# Patient Record
Sex: FEMALE | Race: WHITE | NOT HISPANIC OR LATINO | Employment: OTHER | URBAN - METROPOLITAN AREA
[De-identification: names, ages, dates, MRNs, and addresses within clinical notes are randomized per-mention and may not be internally consistent; named-entity substitution may affect disease eponyms.]

---

## 2017-12-14 ENCOUNTER — APPOINTMENT (OUTPATIENT)
Dept: PHYSICAL THERAPY | Facility: CLINIC | Age: 47
End: 2017-12-14
Payer: COMMERCIAL

## 2017-12-14 PROCEDURE — 97161 PT EVAL LOW COMPLEX 20 MIN: CPT

## 2017-12-14 PROCEDURE — G8981 BODY POS CURRENT STATUS: HCPCS

## 2017-12-14 PROCEDURE — G8982 BODY POS GOAL STATUS: HCPCS

## 2017-12-18 ENCOUNTER — APPOINTMENT (OUTPATIENT)
Dept: PHYSICAL THERAPY | Facility: CLINIC | Age: 47
End: 2017-12-18
Payer: COMMERCIAL

## 2017-12-18 PROCEDURE — 97140 MANUAL THERAPY 1/> REGIONS: CPT

## 2017-12-18 PROCEDURE — 97110 THERAPEUTIC EXERCISES: CPT

## 2017-12-20 ENCOUNTER — APPOINTMENT (OUTPATIENT)
Dept: PHYSICAL THERAPY | Facility: CLINIC | Age: 47
End: 2017-12-20
Payer: COMMERCIAL

## 2017-12-20 PROCEDURE — 97112 NEUROMUSCULAR REEDUCATION: CPT

## 2017-12-20 PROCEDURE — 97110 THERAPEUTIC EXERCISES: CPT

## 2022-03-25 ENCOUNTER — OFFICE VISIT (OUTPATIENT)
Dept: OBGYN CLINIC | Facility: CLINIC | Age: 52
End: 2022-03-25
Payer: COMMERCIAL

## 2022-03-25 ENCOUNTER — APPOINTMENT (OUTPATIENT)
Dept: RADIOLOGY | Facility: CLINIC | Age: 52
End: 2022-03-25
Payer: COMMERCIAL

## 2022-03-25 VITALS
HEIGHT: 62 IN | DIASTOLIC BLOOD PRESSURE: 86 MMHG | HEART RATE: 102 BPM | BODY MASS INDEX: 27.16 KG/M2 | SYSTOLIC BLOOD PRESSURE: 133 MMHG | WEIGHT: 147.6 LBS

## 2022-03-25 DIAGNOSIS — M25.562 PAIN IN BOTH KNEES, UNSPECIFIED CHRONICITY: ICD-10-CM

## 2022-03-25 DIAGNOSIS — M25.561 PAIN IN BOTH KNEES, UNSPECIFIED CHRONICITY: ICD-10-CM

## 2022-03-25 DIAGNOSIS — M17.0 BILATERAL PRIMARY OSTEOARTHRITIS OF KNEE: ICD-10-CM

## 2022-03-25 DIAGNOSIS — M22.2X2 PATELLOFEMORAL SYNDROME OF BOTH KNEES: Primary | ICD-10-CM

## 2022-03-25 DIAGNOSIS — M22.2X1 PATELLOFEMORAL SYNDROME OF BOTH KNEES: Primary | ICD-10-CM

## 2022-03-25 PROCEDURE — 73562 X-RAY EXAM OF KNEE 3: CPT

## 2022-03-25 PROCEDURE — 99204 OFFICE O/P NEW MOD 45 MIN: CPT | Performed by: ORTHOPAEDIC SURGERY

## 2022-03-25 RX ORDER — ACETAMINOPHEN 325 MG/1
650 TABLET ORAL EVERY 6 HOURS PRN
COMMUNITY

## 2022-03-25 RX ORDER — IBUPROFEN 200 MG
TABLET ORAL EVERY 6 HOURS PRN
COMMUNITY

## 2022-03-25 NOTE — PROGRESS NOTES
Assessment/Plan:  1  Patellofemoral syndrome of both knees  Ambulatory Referral to Physical Therapy   2  Bilateral primary osteoarthritis of knee  Ambulatory Referral to Physical Therapy   3  Pain in both knees, unspecified chronicity  XR knee 3 vw left non injury    XR knee 3 vw right non injury       Scribe Attestation    I,:  Alley Muñoz am acting as a scribe while in the presence of the attending physician :       I,:  Rossana Jalloh MD personally performed the services described in this documentation    as scribed in my presence :             Upon evaluation and review of the bilateral knee xrays taken, I believe that July Mackay is presenting today with signs and symptoms consistent with bilateral patellofemoral pain syndrome as well as mild bilateral patellofemoral osteoarthritis  I discussed with July Mackay that for treatment of this condition, I recommend that she she pursue treatment in the form of formal physical therapy to strengthen and improve the function of her bilateral knees  Since July Mackay has experienced success with physical therapy in the past for her back pain, I believe that she should experience improvement in her bilateral knee function with physical therapy  I do not appreciate any findings that would indicate July Mackay for operative intervention at this time  I discussed with July Mackay that I will follow up with her again in 6 weeks for repeat clinical evaluation of her bilateral knees upon completion of 6 weeks of physical therapy  Subjective:   Kumar Jeter is a 46 y o  female who presents to the office today for initial evaluation of her bilateral knee pain  July Mackay states that she has been experiencing bilateral knee pain for over a year now  She states that her right knee is worse than her left today  She states that she experiences pain with activities such as walking and has to limit the distance she walks as well as speed that she walks at     She states that she experiences pain with squatting activities as well  In the mornings she experiences tightness about her knees when she gets out of bed which causes her pain  She experiences increased tension and pain with going up and down stairs  Review of Systems   Constitutional: Negative for chills and fever  HENT: Negative for ear pain and sore throat  Eyes: Negative for pain and visual disturbance  Respiratory: Negative for cough and shortness of breath  Cardiovascular: Negative for chest pain and palpitations  Gastrointestinal: Negative for abdominal pain and vomiting  Genitourinary: Negative for dysuria and hematuria  Musculoskeletal: Positive for arthralgias  Negative for back pain  Skin: Negative for color change and rash  Neurological: Negative for seizures and syncope  All other systems reviewed and are negative  History reviewed  No pertinent past medical history  History reviewed  No pertinent surgical history  Family History   Problem Relation Age of Onset    No Known Problems Mother     No Known Problems Father        Social History     Occupational History    Not on file   Tobacco Use    Smoking status: Never Smoker    Smokeless tobacco: Never Used   Substance and Sexual Activity    Alcohol use: Yes     Comment: social only    Drug use: Never    Sexual activity: Not on file         Current Outpatient Medications:     acetaminophen (TYLENOL) 325 mg tablet, Take 650 mg by mouth every 6 (six) hours as needed for mild pain, Disp: , Rfl:     ibuprofen (MOTRIN) 200 mg tablet, Take by mouth every 6 (six) hours as needed for mild pain, Disp: , Rfl:     No Known Allergies    Objective:  Vitals:    03/25/22 1011   BP: 133/86   Pulse: 102       Right Knee Exam     Tenderness   The patient is experiencing no tenderness       Range of Motion   Extension: 0   Flexion: 130     Tests   Varus: negative Valgus: negative  Drawer:  Anterior - negative    Posterior - negative    Other   Erythema: absent  Scars: absent  Sensation: normal      Left Knee Exam     Tenderness   The patient is experiencing no tenderness  Range of Motion   Extension: 0   Flexion: 130     Tests   Varus: negative Valgus: negative  Drawer:  Anterior - negative     Posterior - negative    Other   Erythema: absent  Scars: present  Sensation: normal            Physical Exam  HENT:      Head: Normocephalic and atraumatic  Eyes:      General:         Right eye: No discharge  Left eye: No discharge  Extraocular Movements: Extraocular movements intact  Conjunctiva/sclera: Conjunctivae normal    Cardiovascular:      Rate and Rhythm: Normal rate  Pulmonary:      Effort: Pulmonary effort is normal  No respiratory distress  Musculoskeletal:         General: Normal range of motion  Cervical back: Normal range of motion and neck supple  Skin:     General: Skin is warm and dry  Neurological:      Mental Status: She is alert and oriented to person, place, and time  Psychiatric:         Mood and Affect: Mood normal          Behavior: Behavior normal          I have personally reviewed pertinent films in PACS and my interpretation is as follows:    Review of the bilateral knee xrays taken today demonstrate mild patellofemoral osteoarthritis  There are no obvious acute osseous abnormalities or deformities

## 2022-03-28 ENCOUNTER — EVALUATION (OUTPATIENT)
Dept: PHYSICAL THERAPY | Facility: CLINIC | Age: 52
End: 2022-03-28
Payer: COMMERCIAL

## 2022-03-28 DIAGNOSIS — M17.0 BILATERAL PRIMARY OSTEOARTHRITIS OF KNEE: ICD-10-CM

## 2022-03-28 DIAGNOSIS — M22.2X1 PATELLOFEMORAL SYNDROME OF BOTH KNEES: ICD-10-CM

## 2022-03-28 DIAGNOSIS — M22.2X2 PATELLOFEMORAL SYNDROME OF BOTH KNEES: ICD-10-CM

## 2022-03-28 PROCEDURE — 97161 PT EVAL LOW COMPLEX 20 MIN: CPT | Performed by: PHYSICAL THERAPIST

## 2022-03-28 NOTE — PROGRESS NOTES
PT Evaluation     Today's date: 3/28/2022  Patient name: Mae Newby  : 1970  MRN: [id number]  Referring provider: Olivia Mcfadden*  Dx:   Encounter Diagnosis     ICD-10-CM    1  Patellofemoral syndrome of both knees  M22 2X1 Ambulatory Referral to Physical Therapy    M22 2X2    2  Bilateral primary osteoarthritis of knee  M17 0 Ambulatory Referral to Physical Therapy                  Assessment  Assessment details: Zohaib Camejo with signs and symptoms consistent with Patellofemoral syndrome of both knees  Bilateral primary osteoarthritis of knee, with loss of range of motion, strength and spinal stabilization  Presents with high reactivity  Mae Newby would benefit with physical therapy to address these impairments to return to prior level of function  Impairments: activity intolerance, impaired balance, impaired physical strength, lacks appropriate home exercise program and pain with function  Understanding of Dx/Px/POC: good   Prognosis: good    Goals  STG  Initiate HEP  SLS > 10 sec in 3 weeks  LTG  Independent with HEP  Strength to 4/5 in 6 weeks  SLS > 20sec in 6 weeks  FOTO > 45 in 6 weeks    Plan  Planned therapy interventions: neuromuscular re-education, stretching, strengthening, therapeutic exercise, balance, balance/weight bearing training and home exercise program  Frequency: 2x week  Duration in visits: 12  Duration in weeks: 6  Treatment plan discussed with: patient        Subjective Evaluation    History of Present Illness  Mechanism of injury: Patient reports bilateral knee pain for the past year that limits her walking ability  She denies any injury            Recurrent probem    Quality of life: good    Pain  Current pain ratin  At best pain rating: 3  At worst pain ratin  Location: Bilateral knee pain  Quality: dull ache and sharp  Relieving factors: rest and ice  Aggravating factors: stair climbing and walking    Treatments  Current treatment: physical therapy  Patient Goals  Patient goals for therapy: decreased pain, improved balance, increased motion, increased strength, independence with ADLs/IADLs and return to sport/leisure activities          Objective     Active Range of Motion   Left Knee   Flexion: 135 degrees   Extension: 0 degrees     Right Knee   Flexion: 135 degrees   Extension: 0 degrees     Strength/Myotome Testing     Left Knee   Flexion: 4-  Extension: 4-    Right Knee   Flexion: 3+  Extension: 3+    Functional Assessment        Single Leg Stance   Left: 4 seconds  Right: 3 seconds      Flowsheet Rows      Most Recent Value   PT/OT G-Codes    Current Score 1   Projected Score 45   FOTO information reviewed Yes   Assessment Type Evaluation   G code set Mobility: Walking & Moving Around             Precautions: standard      Manuals                                                                 Neuro Re-Ed                                                                                                        Ther Ex 3/28            SLR F,ABD 2x10            Clamshell 20x            Reverse clamshell 20x                                                                             Ther Activity                                       Gait Training                                       Modalities

## 2022-03-29 ENCOUNTER — OFFICE VISIT (OUTPATIENT)
Dept: PHYSICAL THERAPY | Facility: CLINIC | Age: 52
End: 2022-03-29
Payer: COMMERCIAL

## 2022-03-29 DIAGNOSIS — M22.2X1 PATELLOFEMORAL SYNDROME OF BOTH KNEES: Primary | ICD-10-CM

## 2022-03-29 DIAGNOSIS — M22.2X2 PATELLOFEMORAL SYNDROME OF BOTH KNEES: Primary | ICD-10-CM

## 2022-03-29 DIAGNOSIS — M17.0 BILATERAL PRIMARY OSTEOARTHRITIS OF KNEE: ICD-10-CM

## 2022-03-29 PROCEDURE — 97112 NEUROMUSCULAR REEDUCATION: CPT | Performed by: PHYSICAL THERAPIST

## 2022-03-29 PROCEDURE — 97110 THERAPEUTIC EXERCISES: CPT | Performed by: PHYSICAL THERAPIST

## 2022-03-29 NOTE — PROGRESS NOTES
Daily Note     Today's date: 3/29/2022  Patient name: Rory Gutierrez  : 1970  MRN: [id number]  Referring provider: Mari Cabrera*  Dx:   Encounter Diagnosis     ICD-10-CM    1  Patellofemoral syndrome of both knees  M22 2X1     M22 2X2    2  Bilateral primary osteoarthritis of knee  M17 0                   Subjective: I was sore from yesterday's exercise        Objective: See treatment diary below      Assessment: Tolerated treatment well  Patient demonstrated fatigue post treatment and exhibited good technique with therapeutic exercises      Plan: Continue per plan of care        Precautions: standard      Manuals                                                                 Neuro Re-Ed  3/29           [place] balance 4 way walkouts  #8 4x10           Balance lunge bosu  20x                                                                            Ther Ex 3/28            SLR F,ABD 2x10            Clamshell 20x            Reverse clamshell 20x            [place] Lunge  #8 20x           TRX Squats  20x           Nustep  10m L1           Step up             Lateral step up             Ther Activity                                       Gait Training                                       Modalities             CP  10m

## 2022-04-04 ENCOUNTER — OFFICE VISIT (OUTPATIENT)
Dept: PHYSICAL THERAPY | Facility: CLINIC | Age: 52
End: 2022-04-04
Payer: COMMERCIAL

## 2022-04-04 DIAGNOSIS — M22.2X2 PATELLOFEMORAL SYNDROME OF BOTH KNEES: Primary | ICD-10-CM

## 2022-04-04 DIAGNOSIS — M17.0 BILATERAL PRIMARY OSTEOARTHRITIS OF KNEE: ICD-10-CM

## 2022-04-04 DIAGNOSIS — M22.2X1 PATELLOFEMORAL SYNDROME OF BOTH KNEES: Primary | ICD-10-CM

## 2022-04-04 PROCEDURE — 97112 NEUROMUSCULAR REEDUCATION: CPT

## 2022-04-04 PROCEDURE — 97110 THERAPEUTIC EXERCISES: CPT

## 2022-04-04 NOTE — PROGRESS NOTES
Daily Note     Today's date: 2022  Patient name: Bj Irving  : 1970  MRN: [id number]  Referring provider: Rosibel Albarran*  Dx:   Encounter Diagnosis     ICD-10-CM    1  Patellofemoral syndrome of both knees  M22 2X1     M22 2X2    2  Bilateral primary osteoarthritis of knee  M17 0                   Subjective: Patient reports she has not done the home exercises because she lost the sheet with description  Patient also notes her right anterior hip has been bothersome recently described as a sharp pain that is intermittent  Objective: See treatment diary below      Assessment: Tolerated treatment fair  Patient arrived late to session, accommodated for  Focused today's session of lower body strength and patient education  Patient provided with a secondary print out of home exercises as well as a follow up e-mail  Patient with (+) right FADDIR  Patient demonstrated fatigue post treatment and exhibited good technique with therapeutic exercises      Plan: Continue per plan of care  Precautions: standard      Manuals                                                                 Neuro Re-Ed  3/29 04/04          Pedro balance 4 way walkouts  #8 4x10 #12 5 4x10          Balance lunge bosu  20x                                                                            Ther Ex 3/28            SLR F,ABD 2x10            Clamshell 20x            Reverse clamshell 20x            [place] Lunge  #8 20x TRX 2x10          TRX Squats  20x 20x          Nustep  10m L1           Step up             Lateral step up             LAQ   #5 2x10 R/L          HEP/ pt ed     updated          Ther Activity                                       Gait Training                                       Modalities             CP  10m

## 2022-04-06 ENCOUNTER — OFFICE VISIT (OUTPATIENT)
Dept: PHYSICAL THERAPY | Facility: CLINIC | Age: 52
End: 2022-04-06
Payer: COMMERCIAL

## 2022-04-06 DIAGNOSIS — M22.2X2 PATELLOFEMORAL SYNDROME OF BOTH KNEES: Primary | ICD-10-CM

## 2022-04-06 DIAGNOSIS — M17.0 BILATERAL PRIMARY OSTEOARTHRITIS OF KNEE: ICD-10-CM

## 2022-04-06 DIAGNOSIS — M22.2X1 PATELLOFEMORAL SYNDROME OF BOTH KNEES: Primary | ICD-10-CM

## 2022-04-06 PROCEDURE — 97110 THERAPEUTIC EXERCISES: CPT | Performed by: PHYSICAL THERAPIST

## 2022-04-06 PROCEDURE — 97112 NEUROMUSCULAR REEDUCATION: CPT | Performed by: PHYSICAL THERAPIST

## 2022-04-06 NOTE — PROGRESS NOTES
Daily Note     Today's date: 2022  Patient name: Riya No  : 1970  MRN: [id number]  Referring provider: Ashly Matson*  Dx:   Encounter Diagnosis     ICD-10-CM    1  Patellofemoral syndrome of both knees  M22 2X1     M22 2X2    2  Bilateral primary osteoarthritis of knee  M17 0                   Subjective: I have crepitus in both knees      Objective: See treatment diary below      Assessment: Tolerated treatment well  Patient demonstrated fatigue post treatment and exhibited good technique with therapeutic exercises      Plan: Continue per plan of care  Precautions: standard      Manuals                                                                 Neuro Re-Ed  3/29 04/04 4/6         Pedro balance 4 way walkouts  #8 4x10 #12 5 4x10 #13 4x10         Balance lunge bosu  20x  20x                                                                          Ther Ex 3/28   4/6         SLR F,ABD 2x10            Clamshell 20x            Reverse clamshell 20x            [place] Lunge  #8 20x TRX 2x10 #9 20x         TRX Squats  20x 20x 20x         Nustep  10m L1  10m L3         Step up    4" 20x         Lateral step up    4" 20x         LAQ   #5 2x10 R/L          HEP/ pt ed     updated          Ther Activity                                       Gait Training                                       Modalities             CP  10m

## 2022-05-06 ENCOUNTER — OFFICE VISIT (OUTPATIENT)
Dept: OBGYN CLINIC | Facility: CLINIC | Age: 52
End: 2022-05-06
Payer: COMMERCIAL

## 2022-05-06 VITALS
SYSTOLIC BLOOD PRESSURE: 133 MMHG | HEART RATE: 94 BPM | BODY MASS INDEX: 27.05 KG/M2 | WEIGHT: 147 LBS | DIASTOLIC BLOOD PRESSURE: 86 MMHG | HEIGHT: 62 IN

## 2022-05-06 DIAGNOSIS — M23.91 INTERNAL DERANGEMENT OF RIGHT KNEE: Primary | ICD-10-CM

## 2022-05-06 DIAGNOSIS — M22.2X1 PATELLOFEMORAL SYNDROME OF BOTH KNEES: ICD-10-CM

## 2022-05-06 DIAGNOSIS — M25.50 POLYARTHRALGIA: ICD-10-CM

## 2022-05-06 DIAGNOSIS — S29.012A STRAIN OF THORACIC SPINE: ICD-10-CM

## 2022-05-06 DIAGNOSIS — M22.2X2 PATELLOFEMORAL SYNDROME OF BOTH KNEES: ICD-10-CM

## 2022-05-06 PROCEDURE — 99214 OFFICE O/P EST MOD 30 MIN: CPT | Performed by: ORTHOPAEDIC SURGERY

## 2022-05-06 NOTE — PROGRESS NOTES
Assessment/Plan:  1  Internal derangement of right knee  MRI knee right  wo contrast   2  Patellofemoral syndrome of both knees     3  Polyarthralgia  Ambulatory Referral to Rheumatology   4  Strain of thoracic spine         Scribe Attestation    I,:  Cinda Cannon am acting as a scribe while in the presence of the attending physician :       I,:  Pennie Santos MD personally performed the services described in this documentation    as scribed in my presence :             Sarmad upon examination and review of the physical therapy notes as well as the x-rays of the right knee does demonstrate signs and symptoms that are concerning for a lateral meniscus tear  She does demonstrate positive Mohan's as well as point tenderness along the lateral joint line  Due to her failure of non operative care of the form physical therapy I would like to order an MRI of the right knee to question lateral meniscus tear  I did provide her with a referral for this today  She will follow up once the MRI of her right knee is completed and further delineation of care will be determined at that time  Sarmad does demonstrate pain in multiple areas upon exam today involving her back, hips and upper extremities  I do have concern that she is developing a rheumatologic disorder resulting in her polyarthralgia  With this in mind I would like to refer her to Rheumatology and Dr Ruddy Barnett for further evaluation  Sarmad verbalized understanding and was amenable to the treatment plan presented to her today  My office will help facilitate her appointment with Dr Ruddy Barnett  Subjective:   Geetha Rick is a 46 y o  female who presents to the office today for follow-up evaluation of her bilateral knee pain  She states that her right knee is worse than her left  She does localize pain to the anterolateral aspect of the right knee and anterior of the left    She states she does have significant pain after being seated for a prolonged period of time  She remarks that she will get a sharp burning the anterolateral aspect of her knee that is most severe during weight-bearing activities  She does not appreciate significant swelling of her knees  She has been participating in physical therapy and has not appreciate any significant symptomatic improvements  She does also have complaints bilateral hip pain and back extending into her thoracic spine  She denies any trauma has resulted her painful symptoms  She notes that she is seated for long period of time she will experience exacerbation of her hip pain  She denies any radiation of paresthesias into the lower extremities  She notes that the pain can radiate proximally into the thoracic spine  She does also have complaints elbow pain and intermittent shoulder pain with overhead reaching activities  Again, she denies any trauma to her shoulders or upper extremities resulting in her painful symptoms  Today she denies any distal paresthesias  She notes that she does not have a history of rheumatologic disorder  However, has not been thoroughly investigated by a rheumatologist       Review of Systems   Constitutional: Negative for chills, fever and unexpected weight change  HENT: Negative for hearing loss, nosebleeds and sore throat  Eyes: Negative for pain, redness and visual disturbance  Respiratory: Negative for cough, shortness of breath and wheezing  Cardiovascular: Negative for chest pain, palpitations and leg swelling  Gastrointestinal: Negative for abdominal pain, nausea and vomiting  Endocrine: Negative for polydipsia and polyuria  Genitourinary: Negative for dysuria and hematuria  Musculoskeletal: Positive for arthralgias, back pain, gait problem and myalgias  See HPI   Skin: Negative for rash and wound  Neurological: Negative for dizziness, numbness and headaches     Psychiatric/Behavioral: Negative for decreased concentration and suicidal ideas  The patient is not nervous/anxious  No past medical history on file  No past surgical history on file  Family History   Problem Relation Age of Onset    No Known Problems Mother     No Known Problems Father        Social History     Occupational History    Not on file   Tobacco Use    Smoking status: Never Smoker    Smokeless tobacco: Never Used   Substance and Sexual Activity    Alcohol use: Yes     Comment: social only    Drug use: Never    Sexual activity: Not on file         Current Outpatient Medications:     acetaminophen (TYLENOL) 325 mg tablet, Take 650 mg by mouth every 6 (six) hours as needed for mild pain, Disp: , Rfl:     ibuprofen (MOTRIN) 200 mg tablet, Take by mouth every 6 (six) hours as needed for mild pain, Disp: , Rfl:     No Known Allergies    Objective:  Vitals:    05/06/22 0933   BP: 133/86   Pulse: 94       Right Hip Exam     Range of Motion   Flexion: 120   External rotation: 60   Internal rotation: 20     Other   Erythema: absent  Scars: absent  Sensation: normal  Pulse: present    Comments:  Stinchfield: negative      Left Hip Exam     Range of Motion   Flexion: 120   External rotation: 60   Internal rotation: 20     Other   Erythema: absent  Scars: absent  Sensation: normal  Pulse: present    Comments:  Stinchfield: negative      Back Exam     Tenderness   The patient is experiencing tenderness in the thoracic  Other   Gait: normal   Erythema: no back redness  Scars: absent            Physical Exam  Vitals reviewed  HENT:      Head: Normocephalic and atraumatic  Eyes:      General:         Right eye: No discharge  Left eye: No discharge  Conjunctiva/sclera: Conjunctivae normal       Pupils: Pupils are equal, round, and reactive to light  Cardiovascular:      Rate and Rhythm: Normal rate  Pulmonary:      Effort: Pulmonary effort is normal  No respiratory distress     Musculoskeletal:      Cervical back: Normal range of motion and neck supple  Comments: As noted in HPI   Skin:     General: Skin is warm and dry  Neurological:      Mental Status: She is alert and oriented to person, place, and time  I have personally reviewed pertinent films in PACS and my interpretation is as follows:    X-rays of left and right knees demonstrate mild tricompartmental osteoarthritis  No acute fractures demonstrated  No obvious lytic blastic lesions demonstrated

## 2022-06-04 ENCOUNTER — HOSPITAL ENCOUNTER (OUTPATIENT)
Dept: RADIOLOGY | Facility: HOSPITAL | Age: 52
Discharge: HOME | End: 2022-06-04
Attending: ORTHOPAEDIC SURGERY
Payer: COMMERCIAL

## 2022-06-04 DIAGNOSIS — M23.91 INTERNAL DERANGEMENT OF RIGHT KNEE: ICD-10-CM

## 2022-06-04 PROCEDURE — 73721 MRI JNT OF LWR EXTRE W/O DYE: CPT

## 2022-06-04 PROCEDURE — G1004 CDSM NDSC: HCPCS

## 2022-06-22 ENCOUNTER — OFFICE VISIT (OUTPATIENT)
Dept: OBGYN CLINIC | Facility: CLINIC | Age: 52
End: 2022-06-22
Payer: COMMERCIAL

## 2022-06-22 VITALS
BODY MASS INDEX: 27.6 KG/M2 | WEIGHT: 150 LBS | SYSTOLIC BLOOD PRESSURE: 121 MMHG | DIASTOLIC BLOOD PRESSURE: 78 MMHG | HEIGHT: 62 IN | HEART RATE: 65 BPM

## 2022-06-22 DIAGNOSIS — S83.271D COMPLEX TEAR OF LATERAL MENISCUS OF RIGHT KNEE, UNSPECIFIED WHETHER OLD OR CURRENT TEAR, SUBSEQUENT ENCOUNTER: ICD-10-CM

## 2022-06-22 DIAGNOSIS — M23.92 INTERNAL DERANGEMENT OF LEFT KNEE: Primary | ICD-10-CM

## 2022-06-22 DIAGNOSIS — Z01.812 PRE-OPERATIVE LABORATORY EXAMINATION: ICD-10-CM

## 2022-06-22 PROCEDURE — 99214 OFFICE O/P EST MOD 30 MIN: CPT | Performed by: ORTHOPAEDIC SURGERY

## 2022-06-22 NOTE — PROGRESS NOTES
Assessment/Plan:  1  Internal derangement of left knee  MRI knee left  wo contrast   2  Complex tear of lateral meniscus of right knee, unspecified whether old or current tear, subsequent encounter  Ambulatory Referral to Physical Therapy    Basic metabolic panel    CBC and differential    APTT    Protime-INR    Case request operating room: [address] [place] [address]    EKG 12 lead    Basic metabolic panel    CBC and differential    APTT    Protime-INR    Case request operating room: [address] [place] [address]   3  Pre-operative laboratory examination  Basic metabolic panel    CBC and differential    APTT    Protime-INR    Case request operating room: MENISCECTOMY LATERAL KNEE    EKG 12 lead    Basic metabolic panel    CBC and differential    APTT    Protime-INR    Case request operating room: [facility]       Scribe Attestation    I,:  Yan Davies am acting as a scribe while in the presence of the attending physician :       I,:  Alice Harris MD personally performed the services described in this documentation    as scribed in my presence :             Sarbjit Cason upon examination and review the MRI of the right knee does demonstrate a large complex tear to anterior horn of the lateral meniscus  She does not demonstrate symptoms on exam today consistent with the lateral meniscus tear  However she does have complaints of intermittent locking and painful symptoms laterally during physical activities  With this in mind I did note that she could consider surgical intervention the form of a right knee arthroscopy with meniscectomy  I did discuss the procedure with her at length as well as the risks including but not limited to bleeding, infection, nerve injury resulting weakness, numbness, pain, stiffness, worsening of symptoms, recurrent injury, failure surgery and need for further surgery  She verbalized understanding and was amenable to the treatment presented to her today    She did provide signed consent for a right knee arthroscopy with lateral meniscectomy  She will meet with my surgical scheduler to set up a date and time to have the surgery completed  In regards to her left knee there is concern for a meniscus pathology as well  She is a failure of non operative intervention the form physical therapy would like to order an MRI to question a meniscus tear  This was provided to her today  My office will help facilitate having the MRI scheduled  She may follow-up with me prior to her right knee arthroscopy for review of the MRI of the left knee  Subjective:   Christina Sen is a 46 y o  female who presents to the office today for follow-up evaluation of her right knee  She experiences pain to the anterior medial aspect typically  Intermittently she will experience lateral knee pain particularly with fast walking  She states she does have difficulty with fully extending her knee during brisk walks  She does on occasion feel like her knee does hyperextend  She does also have complaints of intermittent clicking and locking of the knee  Today however she is relatively asymptomatic  She did have an MRI of the right knee completed to be reviewed today  She does have complaints of left knee pain as well as left lateral ankle pain  Denies any trauma that has resulted his painful symptoms  She notes that due to her polyarthralgia she does have appointment with rheumatologist tomorrow to rule out any rheumatologic involvement of her multiple joint pain  Review of Systems   Constitutional: Negative for chills, fever and unexpected weight change  HENT: Negative for hearing loss, nosebleeds and sore throat  Eyes: Negative for pain, redness and visual disturbance  Respiratory: Negative for cough, shortness of breath and wheezing  Cardiovascular: Negative for chest pain, palpitations and leg swelling  Gastrointestinal: Negative for abdominal pain, nausea and vomiting  Endocrine: Negative for polydipsia and polyuria  Genitourinary: Negative for dysuria and hematuria  Musculoskeletal: Positive for arthralgias and myalgias  See HPI   Skin: Negative for rash and wound  Neurological: Negative for dizziness, numbness and headaches  Psychiatric/Behavioral: Negative for decreased concentration and suicidal ideas  The patient is not nervous/anxious  History reviewed  No pertinent past medical history  History reviewed  No pertinent surgical history  Family History   Problem Relation Age of Onset    No Known Problems Mother     No Known Problems Father        Social History     Occupational History    Not on file   Tobacco Use    Smoking status: Never Smoker    Smokeless tobacco: Never Used   Substance and Sexual Activity    Alcohol use: Yes     Comment: social only    Drug use: Never    Sexual activity: Not on file         Current Outpatient Medications:     acetaminophen (TYLENOL) 325 mg tablet, Take 650 mg by mouth every 6 (six) hours as needed for mild pain, Disp: , Rfl:     ibuprofen (MOTRIN) 200 mg tablet, Take by mouth every 6 (six) hours as needed for mild pain, Disp: , Rfl:     No Known Allergies    Objective:  Vitals:    06/22/22 1006   BP: 121/78   Pulse: 65       Right Knee Exam     Tenderness   The patient is experiencing no tenderness  Range of Motion   Extension: 0   Flexion: 120     Tests   Varus: negative Valgus: negative  Lachman:  Anterior - negative    Posterior - negative  Drawer:  Anterior - negative    Posterior - negative      Left Knee Exam     Tenderness   The patient is experiencing tenderness in the patellar tendon  Range of Motion   Extension: 0   Flexion: 120     Tests   Varus: negative Valgus: negative  Lachman:  Anterior - negative    Posterior - negative  Drawer:  Anterior - negative     Posterior - negative            Physical Exam  Vitals reviewed  HENT:      Head: Normocephalic and atraumatic     Eyes: General:         Right eye: No discharge  Left eye: No discharge  Conjunctiva/sclera: Conjunctivae normal       Pupils: Pupils are equal, round, and reactive to light  Cardiovascular:      Rate and Rhythm: Normal rate  Pulmonary:      Effort: Pulmonary effort is normal  No respiratory distress  Musculoskeletal:      Cervical back: Normal range of motion and neck supple  Comments: As noted in HPI   Skin:     General: Skin is warm and dry  Neurological:      Mental Status: She is alert and oriented to person, place, and time  I have personally reviewed pertinent films in PACS and my interpretation is as follows:    MRI of the right knee demonstrates a large complex tear of the anterior horn of the lateral meniscus    X-rays of the left knee obtained on 3/25/2022 demonstrates no acute fracture or other osseous abnormalities  No obvious lytic or blastic lesions demonstrated

## 2022-06-23 ENCOUNTER — OFFICE VISIT (OUTPATIENT)
Dept: RHEUMATOLOGY | Facility: CLINIC | Age: 52
End: 2022-06-23
Payer: COMMERCIAL

## 2022-06-23 VITALS
HEART RATE: 86 BPM | BODY MASS INDEX: 27.6 KG/M2 | OXYGEN SATURATION: 96 % | WEIGHT: 150 LBS | TEMPERATURE: 98.9 F | SYSTOLIC BLOOD PRESSURE: 116 MMHG | HEIGHT: 62 IN | DIASTOLIC BLOOD PRESSURE: 76 MMHG

## 2022-06-23 DIAGNOSIS — S83.281S TEAR OF LATERAL MENISCUS OF RIGHT KNEE, CURRENT, UNSPECIFIED TEAR TYPE, SEQUELA: ICD-10-CM

## 2022-06-23 DIAGNOSIS — Z11.59 NEED FOR HEPATITIS C SCREENING TEST: ICD-10-CM

## 2022-06-23 DIAGNOSIS — M25.50 POLYARTHRALGIA: Primary | ICD-10-CM

## 2022-06-23 PROCEDURE — 99205 OFFICE O/P NEW HI 60 MIN: CPT | Performed by: INTERNAL MEDICINE

## 2022-06-23 NOTE — PROGRESS NOTES
Assessment and Plan:   Ms Ashley Soliman is a 42-year-old female with history significant for a recently diagnosed right knee lateral meniscal tear who presents for an evaluation of diffuse joint pains  She is referred by Dr Angela Tripathi for a rheumatology consult  Danya Trevizo presents today for an evaluation of diffuse arthralgias which she has specifically noticed in the past 6 months  Although she has noticed joint swelling and also describes prolonged morning stiffness, I do not appreciate any evidence of synovitis on her examination today and her presentation does not appear consistent with an inflammatory arthritis  I advised her to further evaluate for an inflammatory arthropathy I will complete the serological and x-ray workup as listed below and determine based on this if she needs a rheumatology follow-up  It is reassuring to note that there were no inflammatory arthritis features noted on the MRI of her right knee  She will follow up with Orthopedics for this  In the meanwhile she will continue with Tylenol and Advil as needed  I advised her if we are able to rule out an inflammatory arthropathy then she needs to follow up with her primary care physician or Orthopedics for pain control  Plan:  Diagnoses and all orders for this visit:    Polyarthralgia  -     Antinuclear Antibodies, IFA; Future  -     Sjogren's Antibodies; Future  -     Sedimentation rate, automated; Future  -     C-reactive protein; Future  -     Ferritin; Future  -     HLA-B27 antigen; Future  -     Rheumatoid Arthritis Profile; Future  -     Ambulatory Referral to Rheumatology  -     XR hips bilateral 3-4 vw w pelvis if performed; Future  -     XR sacroiliac joints 3+ views;  Future  -     Antinuclear Antibodies, IFA  -     Sjogren's Antibodies  -     Sedimentation rate, automated  -     C-reactive protein  -     Ferritin  -     HLA-B27 antigen  -     Rheumatoid Arthritis Profile    Tear of lateral meniscus of right knee, current, unspecified tear type, sequela    Need for hepatitis C screening test  -     Hepatitis B surface antigen; Future  -     Hepatitis C antibody; Future  -     Hepatitis B surface antigen  -     Hepatitis C antibody      I have personally reviewed pertinent films in PACS of the right knee XR which is unremarkable  Activities as tolerated  Exercise: try to maintain a low impact exercise regimen as much as possible  Continue other medications as prescribed by PCP and other specialists  RTC PRN  HPI  Ms Bethanie Cain is a 59-year-old female with history significant for a recently diagnosed right knee lateral meniscal tear who presents for an evaluation of diffuse joint pains  She is referred by Dr Lyndsey Ryan for a rheumatology consult  Patient reports she has experienced joint pains for a few years now but did not pay much attention to them up until 6 months ago when she started to notice they were becoming more prominent  She experiences some degree of joint pain on a daily basis but it can vary in location and intensity  She describes pain that can affect her hands, wrists, groin region especially on the right side, knees, ankles, feet as well as in the mid to low back region  She previously had left elbow tendinitis for which she received a cortisone injection and this significantly helped  No current pain in her elbows or shoulders  She has occasionally noticed swelling of her knees and ankles  She experiences morning stiffness affecting her diffusely that takes at least 1 hour to improve  She will take Tylenol and Advil as needed which does provide her with some relief  In regards to the low back pain this does not wake her up from sleep at night unless she moves  It is associated with morning stiffness that takes about 1 hour to improve    Generally activities will worsen her pain and she obtains initial relief with resting, but states if she sits for prolonged periods of time the back pain will worsen  She reports chronic dry eyes and dry mouth  She denies fevers, unintentional weight loss, alopecia, inflammatory eye disease, skin rash, psoriasis, mouth/nose ulcers, swollen glands, pleuritic chest pain, inflammatory bowel disease, blood clots (reports a history of 1 miscarriage at the age of 32), Raynaud's (reports mild bluish discoloration with cold exposure) or a family history of autoimmune disease  The following portions of the patient's history were reviewed and updated as appropriate: allergies, current medications, past family history, past medical history, past social history, past surgical history and problem list       Review of Systems  Constitutional: Negative for weight change, fevers, chills, night sweats, fatigue  ENT/Mouth: Negative for hearing changes, ear pain, nasal congestion, sinus pain, hoarseness, sore throat, rhinorrhea, swallowing difficulty  Eyes: Negative for pain, redness, discharge, vision changes  Cardiovascular: Negative for chest pain, SOB, palpitations  Respiratory: Negative for cough, sputum, wheezing, dyspnea  Gastrointestinal: Negative for nausea, vomiting, diarrhea, constipation, pain, heartburn  Genitourinary: Negative for dysuria, urinary frequency, hematuria  Musculoskeletal: As per HPI  Skin: Negative for skin rash, color changes  Neuro: Negative for weakness, loss of consciousness  Psych: Negative for anxiety, depression  Heme/Lymph: Negative for easy bruising, bleeding, lymphadenopathy  History reviewed  No pertinent past medical history  History reviewed  No pertinent surgical history        Social History     Socioeconomic History    Marital status: /Civil Union     Spouse name: Not on file    Number of children: Not on file    Years of education: Not on file    Highest education level: Not on file   Occupational History    Not on file   Tobacco Use    Smoking status: Never Smoker    Smokeless tobacco: Never Used   Substance and Sexual Activity    Alcohol use: Yes     Comment: social only    Drug use: Never    Sexual activity: Not on file   Other Topics Concern    Not on file   Social History Narrative    Not on file     Social Determinants of Health     Financial Resource Strain: Not on file   Food Insecurity: Not on file   Transportation Needs: Not on file   Physical Activity: Not on file   Stress: Not on file   Social Connections: Not on file   Intimate Partner Violence: Not on file   Housing Stability: Not on file       Family History   Problem Relation Age of Onset    No Known Problems Mother     No Known Problems Father        No Known Allergies      Current Outpatient Medications:     acetaminophen (TYLENOL) 325 mg tablet, Take 650 mg by mouth every 6 (six) hours as needed for mild pain, Disp: , Rfl:     ibuprofen (MOTRIN) 200 mg tablet, Take by mouth every 6 (six) hours as needed for mild pain, Disp: , Rfl:       Objective:    Vitals:    06/23/22 1338   BP: 116/76   Pulse: 86   Temp: 98 9 °F (37 2 °C)   SpO2: 96%   Weight: 68 kg (150 lb)   Height: 5' 2" (1 575 m)       Physical Exam  General: Well appearing, well nourished, in no distress  Oriented x 3, normal mood and affect  Ambulating without difficulty  Skin: Good turgor, no rash, unusual bruising or prominent lesions  Hair: Normal texture and distribution  Nails: Normal color, no deformities  HEENT:  Head: Normocephalic, atraumatic  Eyes: Conjunctiva clear, sclera non-icteric, EOM intact  Extremities: No amputations or deformities, cyanosis, edema  Musculoskeletal:  There is no peripheral joint soft tissue swelling or tenderness noted on her examination today  No sacroiliac joint tenderness  Neurologic: Alert and oriented  No focal neurological deficits appreciated  Psychiatric: Normal mood and affect  BUTCH Collazo    Rheumatology

## 2022-06-27 ENCOUNTER — TELEPHONE (OUTPATIENT)
Dept: OBGYN CLINIC | Facility: CLINIC | Age: 52
End: 2022-06-27

## 2022-06-27 DIAGNOSIS — M23.92 INTERNAL DERANGEMENT OF LEFT KNEE: Primary | ICD-10-CM

## 2022-06-27 NOTE — TELEPHONE ENCOUNTER
Called pt and made her aware  A new PT script is needed for the left knee  Pt is going to [facility] for therapy and will call us back with the fax number for us to send over the script

## 2022-06-27 NOTE — TELEPHONE ENCOUNTER
MRI LEFT KNEE DENIED:    Your records show that you have knee pain  The request cannot be approved because: At least two of the following must be met  -Your knee locks or does not straighten fully    -Your knee is unstable with a positive test (Mohans, Thessaly, or Apley's Compression) to check for a tear in the tissues that connect your knee structures (meniscus)  -You have fluid on your knee (effusion)  -You had a twisting or sudden (acute) injury to your knee  Imaging requires six weeks of provider directed treatment to be completed  Supported treatments include (but are not limited to) drugs for swelling or pain, an in office workout (physical therapy), and/or oral or injected steroids  This must have been completed in the past three months without improved symptoms  Contact (via office visit, phone, email, or messaging) must occur after the treatment is completed  This has not been met because: You have not completed six weeks of provider directed treatment  Symptoms must be the same or worse after treatment to support imaging  here was no contact with your provider after completing treatment

## 2022-06-28 NOTE — TELEPHONE ENCOUNTER
[facility] fax [phone]  [phone]  Please fax  For patient RX PT left knee,   She is at PT now,

## 2022-06-29 ENCOUNTER — TELEPHONE (OUTPATIENT)
Dept: OBGYN CLINIC | Facility: CLINIC | Age: 52
End: 2022-06-29

## 2022-06-29 NOTE — TELEPHONE ENCOUNTER
Patient is calling to find out if she can move her surgery date up   Call transferred to surgery coordinator

## 2022-06-29 NOTE — TELEPHONE ENCOUNTER
Patient called , she is currently at [address] [place] [address] and was asking if the labs that [facility] ordered can be faxed to [phone]       Reached out to clinical team and they are sending them now

## 2022-06-29 NOTE — TELEPHONE ENCOUNTER
Gia Sierra had asked at time of scheduling (6/22/22) if you could recommend a spine surgeon in the area  Insurance requires her to stay in [place]  Thank you

## 2022-06-29 NOTE — TELEPHONE ENCOUNTER
Patient called back for Francesca Rucker, call [address] [facility] # [phone]

## 2022-06-30 LAB
APTT PPP: 33 SEC (ref 24–33)
BASOPHILS # BLD AUTO: 0 X10E3/UL (ref 0–0.2)
BASOPHILS NFR BLD AUTO: 1 %
BUN SERPL-MCNC: 12 MG/DL (ref 6–24)
BUN/CREAT SERPL: 21 (ref 9–23)
CALCIUM SERPL-MCNC: 9.4 MG/DL (ref 8.7–10.2)
CHLORIDE SERPL-SCNC: 107 MMOL/L (ref 96–106)
CO2 SERPL-SCNC: 19 MMOL/L (ref 20–29)
CREAT SERPL-MCNC: 0.58 MG/DL (ref 0.57–1)
EGFR: 109 ML/MIN/1.73
EOSINOPHIL # BLD AUTO: 0.1 X10E3/UL (ref 0–0.4)
EOSINOPHIL NFR BLD AUTO: 2 %
ERYTHROCYTE [DISTWIDTH] IN BLOOD BY AUTOMATED COUNT: 12.4 % (ref 11.7–15.4)
GLUCOSE SERPL-MCNC: 92 MG/DL (ref 65–99)
HCT VFR BLD AUTO: 44.2 % (ref 34–46.6)
HGB BLD-MCNC: 15.3 G/DL (ref 11.1–15.9)
IMM GRANULOCYTES # BLD: 0 X10E3/UL (ref 0–0.1)
IMM GRANULOCYTES NFR BLD: 0 %
INR PPP: 1 (ref 0.9–1.2)
LYMPHOCYTES # BLD AUTO: 2.1 X10E3/UL (ref 0.7–3.1)
LYMPHOCYTES NFR BLD AUTO: 40 %
MCH RBC QN AUTO: 31 PG (ref 26.6–33)
MCHC RBC AUTO-ENTMCNC: 34.6 G/DL (ref 31.5–35.7)
MCV RBC AUTO: 90 FL (ref 79–97)
MONOCYTES # BLD AUTO: 0.4 X10E3/UL (ref 0.1–0.9)
MONOCYTES NFR BLD AUTO: 7 %
NEUTROPHILS # BLD AUTO: 2.5 X10E3/UL (ref 1.4–7)
NEUTROPHILS NFR BLD AUTO: 50 %
PLATELET # BLD AUTO: 274 X10E3/UL (ref 150–450)
POTASSIUM SERPL-SCNC: 4 MMOL/L (ref 3.5–5.2)
PROTHROMBIN TIME: 10.3 SEC (ref 9.1–12)
RBC # BLD AUTO: 4.94 X10E6/UL (ref 3.77–5.28)
SODIUM SERPL-SCNC: 145 MMOL/L (ref 134–144)
WBC # BLD AUTO: 5.1 X10E3/UL (ref 3.4–10.8)

## 2022-07-01 ENCOUNTER — TELEPHONE (OUTPATIENT)
Dept: OBGYN CLINIC | Facility: CLINIC | Age: 52
End: 2022-07-01

## 2022-07-01 NOTE — TELEPHONE ENCOUNTER
Fernando Soler called to reschedule her surgery from 7/7/22  Her travels planned have changed  Surgery is being rescheduled to 7/20/22, post-op PT needs to r/s at [facility] include [facility] beds at the [facility] to 7/21/22, post-op eval with Zully Lank 7/28/22

## 2022-07-05 ENCOUNTER — TELEPHONE (OUTPATIENT)
Dept: OBGYN CLINIC | Facility: CLINIC | Age: 52
End: 2022-07-05

## 2022-07-05 NOTE — TELEPHONE ENCOUNTER
PAT labs and completed  EKG results scanned in chart under Cardiology and in Media      Diagnosis:   Normal sinus rhythm  Low QRS voltages in precordial leads  Summary: Abnormal ECG    Please advise

## 2022-07-05 NOTE — TELEPHONE ENCOUNTER
Called patient to advise and assist in scheduling a clearance appointment  Sydni's voice mail box has not been set up yet, unable to leave a message  Sent her an email asking her to contact the office to assist in scheduling cardiac clearance

## 2022-07-06 NOTE — TELEPHONE ENCOUNTER
Annia Cash is scheduled 7/20/22 for a lateral meniscectomy  Her PAT EKG was abnormal and Dr Jong Lara has ordered Cardiac Clearance  Annia Cash is on vacation in [place] [facility]  I cannot reach her  I believe she returns to the [address] [facility] 7/15/22  Her voice mail is not set up so I am unable to contact her  She did state I could e-mail her which I did yesterday and again this afternoon  I will forward you the e-mail so you will have as a reference  I plan on following up each day this week

## 2022-07-09 LAB
ANA TITR SER IF: NEGATIVE {TITER}
CCP IGA+IGG SERPL IA-ACNC: 7 UNITS (ref 0–19)
CRP SERPL-MCNC: 6 MG/L (ref 0–10)
ENA SS-A AB SER-ACNC: <0.2 AI (ref 0–0.9)
ENA SS-B AB SER-ACNC: <0.2 AI (ref 0–0.9)
ERYTHROCYTE [SEDIMENTATION RATE] IN BLOOD BY WESTERGREN METHOD: 5 MM/HR (ref 0–40)
FERRITIN SERPL-MCNC: 262 NG/ML (ref 15–150)
HBV SURFACE AG SERPL QL IA: NEGATIVE
HCV AB S/CO SERPL IA: <0.1 S/CO RATIO (ref 0–0.9)
HLA-B27 QL NAA+PROBE: NEGATIVE
RHEUMATOID FACT SERPL-ACNC: <10 IU/ML

## 2022-07-15 ENCOUNTER — TELEPHONE (OUTPATIENT)
Dept: OBGYN CLINIC | Facility: HOSPITAL | Age: 52
End: 2022-07-15

## 2022-07-15 NOTE — TELEPHONE ENCOUNTER
Dr Kristen Lopez    Patient needs to cancel her 7/20 sx  [place]aid [place] be [place]        # [phone]

## 2022-07-25 ENCOUNTER — TELEPHONE (OUTPATIENT)
Dept: OBGYN CLINIC | Facility: HOSPITAL | Age: 52
End: 2022-07-25

## 2022-07-25 NOTE — TELEPHONE ENCOUNTER
Pt sees Dr Nevaeh Manuel    Pt is calling stating that she would like to schedule surgery for her rt knee    DG#[phone]

## 2022-07-25 NOTE — TELEPHONE ENCOUNTER
Tried calling patient to set up an appointment, No mail box set up to leave VM   I will try to call again later  If patient calls back, Jean-Claude Hodge has appointments 08/23/2022

## 2022-07-25 NOTE — TELEPHONE ENCOUNTER
Can you please help this patient reschedule her surgery? She was scheduled for 7/20/2022 and had to have it canceled

## 2022-07-27 NOTE — TELEPHONE ENCOUNTER
Does she need an appointment prior to surgery? She was on the schedule for 7/20, however, needed to cancel at that time  I've rescheduled her for August 25th  If she needs to be seen, can she see you in Dr Shore Ear absence?

## 2022-08-10 ENCOUNTER — CONSULT (OUTPATIENT)
Dept: CARDIOLOGY CLINIC | Facility: CLINIC | Age: 52
End: 2022-08-10
Payer: COMMERCIAL

## 2022-08-10 VITALS
HEART RATE: 78 BPM | SYSTOLIC BLOOD PRESSURE: 112 MMHG | WEIGHT: 148 LBS | OXYGEN SATURATION: 97 % | TEMPERATURE: 97.8 F | BODY MASS INDEX: 27.23 KG/M2 | HEIGHT: 62 IN | DIASTOLIC BLOOD PRESSURE: 80 MMHG

## 2022-08-10 DIAGNOSIS — R07.2 PRECORDIAL PAIN: Primary | ICD-10-CM

## 2022-08-10 DIAGNOSIS — Z01.812 ENCOUNTER FOR PRE-OPERATIVE LABORATORY TESTING: ICD-10-CM

## 2022-08-10 DIAGNOSIS — M23.91 INTERNAL DERANGEMENT OF RIGHT KNEE: ICD-10-CM

## 2022-08-10 PROCEDURE — 99203 OFFICE O/P NEW LOW 30 MIN: CPT | Performed by: INTERNAL MEDICINE

## 2022-08-10 PROCEDURE — 93000 ELECTROCARDIOGRAM COMPLETE: CPT | Performed by: INTERNAL MEDICINE

## 2022-08-10 NOTE — PROGRESS NOTES
Consultation - Cardiology Office  [facility]  Carmen Garcia 46 y o  female MRN: [id number]  : 1970  Unit/Bed#:  Encounter: [id number]      ASSESSMENT:  Perioperative cardiac risk assessment for orthopedic surgery/arthroscopic right knee lateral meniscectomy on 2022 under general anesthesia    Right lateral meniscus tear    Chest pain:  Probably noncardiac      RECOMMENDATIONS:  Echocardiogram, to evaluate LV function and regional wall motion  Will determine perioperative cardiac risk after checking echo result            Thank you for your consultation  If you have any question please call me at [phone]      Primary Care Physician Requesting Consult: Caryn Grant DO      Reason for Consult / Principal Problem:  Perioperative risk assessment        HPI :     Carmen Garcia is a 46y o  year old female who was referred by primary care doctor for evaluation of perioperative cardiac risk prior to undergoing orthopedic surgery  Patient has right lateral meniscus tear and has been having knee pain  MRI confirmed the diagnosis and arthroscopic surgery is planned for the  of this month  On asking patient states that she gets some sharp retrosternal chest pain off and on  Will request echocardiogram to evaluate LV function and regional wall motion since patient is unable to walk on a treadmill adequately because of her meniscal tear  Review of Systems   Cardiovascular: Positive for chest pain  Musculoskeletal: Positive for arthralgias and gait problem  All other systems reviewed and are negative  Historical Information   No past medical history on file  No past surgical history on file    Social History     Substance and Sexual Activity   Alcohol Use Yes    Comment: social only     Social History     Substance and Sexual Activity   Drug Use Never     Social History     Tobacco Use   Smoking Status Never Smoker   Smokeless Tobacco Never Used     Family History:   Family History   Problem Relation Age of Onset    No Known Problems Mother     No Known Problems Father        Meds/Allergies     No Known Allergies    Current Outpatient Medications:     acetaminophen (TYLENOL) 325 mg tablet, Take 650 mg by mouth every 6 (six) hours as needed for mild pain, Disp: , Rfl:     ibuprofen (MOTRIN) 200 mg tablet, Take by mouth every 6 (six) hours as needed for mild pain, Disp: , Rfl:     Vitals: Blood pressure 112/80, pulse 78, temperature 97 8 °F (36 6 °C), height 5' 2" (1 575 m), weight 67 1 kg (148 lb), SpO2 97 %  ?  Body mass index is 27 07 kg/m²  Vitals:    08/10/22 0916   Weight: 67 1 kg (148 lb)     BP Readings from Last 3 Encounters:   08/10/22 112/80   06/23/22 116/76   06/22/22 121/78       PHYSICAL EXAMINATION:  Neurologic:  Alert & oriented x 3, no new focal deficits, Not in any acute distress,  Constitutional:  Well developed, well nourished, non-toxic appearance   Eyes:  Pupil equal and reacting to light, conjunctiva normal, No JVP, No LNP   HENT:  Atraumatic, oropharynx moist, Neck- normal range of motion, no tenderness,  Neck supple   Respiratory:  Bilateral air entry, mostly clear to auscultation  Cardiovascular: S1-S2 regular with a I/VI systolic murmur   GI:  Soft, nondistended, normal bowel sounds, nontender, no hepatosplenomegaly appreciated  Musculoskeletal:  Right knee tenderness  Skin:  Well hydrated, no rash   Lymphatic:  No lymphadenopathy noted   Extremities:  No edema and distal pulses are present    Diagnostic Studies Review Cardio:      EKG:  Normal sinus rhythm with sinus arrhythmia, heart rate 78 per minute    Cardiac testing:   No results found for this or any previous visit  Imaging:  Chest X-Ray:   No Chest XR results available for this patient  CT-scan of the chest:     No CTA results available for this patient    Lab Review   Lab Results   Component Value Date    WBC 5 1 06/29/2022    HGB 15 3 06/29/2022    HCT 44 2 06/29/2022    MCV 90 06/29/2022    RDW 12 4 06/29/2022     06/29/2022     BMP:  Lab Results   Component Value Date    SODIUM 145 (H) 06/29/2022    K 4 0 06/29/2022     (H) 06/29/2022    CO2 19 (L) 06/29/2022    BUN 12 06/29/2022    CREATININE 0 58 06/29/2022    GLUC 92 06/29/2022    EGFR 109 06/29/2022     LFT:  No results found for: AST, ALT, ALKPHOS, TP, ALB   No results found for: [id number]  No components found for: TSH3  No results found for: HGBA1C  Lipid Profile:   No results found for: CHOLESTEROL, HDL, LDLCALC, TRIG  No results found for: CHOLESTEROL  No results found for: CKTOTAL, CKMB, CKMBINDEX, TROPONINI  No results found for: NTBNP   No results found for this or any previous visit (from the past 672 hour(s))  Dr Kwadwo Tellez MD, [facility]      "This note has been constructed using a voice recognition system  Therefore there may be syntax, spelling, and/or grammatical errors  Please call if you have any questions  "    ADDENDUM   08/24/22:  Patient's echocardiogram showed overall preserved LV systolic function with ejection fraction of 50-55% and no obvious wall motion abnormality  As such, the patient has acceptable perioperative cardiac risk to undergo orthopedic surgery  Dr Kwadwo Tellez MD, [facility]      "This note has been constructed using a voice recognition system  Therefore there may be syntax, spelling, and/or grammatical errors   Please call if you have any questions  "

## 2022-08-19 ENCOUNTER — TELEPHONE (OUTPATIENT)
Dept: OBGYN CLINIC | Facility: CLINIC | Age: 52
End: 2022-08-19

## 2022-08-19 NOTE — PRE-PROCEDURE INSTRUCTIONS
My Surgical Experience    The following information was developed to assist you to prepare for your operation  What do I need to do before coming to the hospital?   Arrange for a responsible person to drive you to and from the hospital    Arrange care for your children at home  Children are not allowed in the recovery areas of the hospital   Plan to wear clothing that is easy to put on and take off  If you are having shoulder surgery, wear a shirt that buttons or zippers in the front  Bathing  o Shower the evening before and the morning of your surgery with an antibacterial soap  Please refer to the Pre Op Showering Instructions for Surgery Patients Sheet   o Remove nail polish and all body piercing jewelry  o Do not shave any body part for at least 24 hours before surgery-this includes face, arms, legs and upper body  Food  o Nothing to eat or drink after midnight the night before your surgery  This includes candy and chewing gum  o Exception: If your surgery is after 12:00pm (noon), you may have clear liquids such as 7-Up®, ginger ale, apple or cranberry juice, Jell-O®, water, or clear broth until 8:00 am  o Do not drink milk or juice with pulp on the morning before surgery  o Do not drink alcohol 24 hours before surgery  Medicine  o Follow instructions you received from your surgeon about which medicines you may take on the day of surgery  o If instructed to take medicine on the morning of surgery, take pills with just a small sip of water  Call your prescribing doctor for specific infroamtion on what to do if you take insulin    What should I bring to the hospital?    Bring:  Mitcheal Sharonda or a walker, if you have them, for foot or knee surgery   A list of the daily medicines, vitamins, minerals, herbals and nutritional supplements you take   Include the dosages of medicines and the time you take them each day   Glasses, dentures or hearing aids   Minimal clothing; you will be wearing hospital sleepwear   Photo ID; required to verify your identity   If you have a Living Will or Power of , bring a copy of the documents   If you have an ostomy, bring an extra pouch and any supplies you use    Do not bring   Medicines or inhalers   Money, valuables or jewelry    What other information should I know about the day of surgery?  Notify your surgeons if you develop a cold, sore throat, cough, fever, rash or any other illness   Report to the Ambulatory Surgical/Same Day Surgery Unit   You will be instructed to stop at Registration only if you have not been pre-registered   Inform your  fi they do not stay that they will be asked by the staff to leave a phone number where they can be reached   Be available to be reached before surgery  In the event the operating room schedule changes, you may be asked to come in earlier or later than expected    *It is important to tell your doctor and others involved in your health care if you are taking or have been taking any non-prescription drugs, vitamins, minerals, herbals or other nutritional supplements  Any of these may interact with some food or medicines and cause a reaction      Pre-Surgery Instructions:   Medication Instructions    acetaminophen (TYLENOL) 325 mg tablet Uses PRN- DO NOT take day of surgery    ibuprofen (MOTRIN) 200 mg tablet Stop taking 7 days prior to surgery

## 2022-08-24 ENCOUNTER — HOSPITAL ENCOUNTER (OUTPATIENT)
Dept: NON INVASIVE DIAGNOSTICS | Facility: HOSPITAL | Age: 52
Discharge: HOME | End: 2022-08-24
Attending: INTERNAL MEDICINE
Payer: COMMERCIAL

## 2022-08-24 ENCOUNTER — ANESTHESIA EVENT (OUTPATIENT)
Dept: PERIOP | Facility: HOSPITAL | Age: 52
End: 2022-08-24
Payer: COMMERCIAL

## 2022-08-24 ENCOUNTER — TELEPHONE (OUTPATIENT)
Dept: OBGYN CLINIC | Facility: CLINIC | Age: 52
End: 2022-08-24

## 2022-08-24 VITALS
HEART RATE: 60 BPM | DIASTOLIC BLOOD PRESSURE: 80 MMHG | HEIGHT: 62 IN | BODY MASS INDEX: 27.23 KG/M2 | SYSTOLIC BLOOD PRESSURE: 112 MMHG | WEIGHT: 148 LBS

## 2022-08-24 DIAGNOSIS — R07.2 PRECORDIAL PAIN: ICD-10-CM

## 2022-08-24 DIAGNOSIS — M23.91 DERANGEMENT OF RIGHT KNEE: Primary | ICD-10-CM

## 2022-08-24 DIAGNOSIS — Z01.812 ENCOUNTER FOR PRE-OPERATIVE LABORATORY TESTING: ICD-10-CM

## 2022-08-24 LAB
AORTIC ROOT: 2.9 CM
APICAL FOUR CHAMBER EJECTION FRACTION: 58 %
AV LVOT PEAK GRADIENT: 3 MMHG
AV PEAK GRADIENT: 5 MMHG
FRACTIONAL SHORTENING: 34 % (ref 28–44)
INTERVENTRICULAR SEPTUM IN DIASTOLE (PARASTERNAL SHORT AXIS VIEW): 0.8 CM
INTERVENTRICULAR SEPTUM: 0.8 CM (ref 0.6–1.1)
LAAS-AP2: 11.3 CM2
LAAS-AP4: 12.1 CM2
LEFT ATRIUM AREA SYSTOLE SINGLE PLANE A4C: 11.3 CM2
LEFT ATRIUM SIZE: 3.1 CM
LEFT INTERNAL DIMENSION IN SYSTOLE: 2.9 CM (ref 2.1–4)
LEFT VENTRICULAR INTERNAL DIMENSION IN DIASTOLE: 4.4 CM (ref 3.5–6)
LEFT VENTRICULAR POSTERIOR WALL IN END DIASTOLE: 0.8 CM
LEFT VENTRICULAR STROKE VOLUME: 56 ML
LVSV (TEICH): 56 ML
MV E'TISSUE VEL-LAT: 10 CM/S
MV E'TISSUE VEL-SEP: 7 CM/S
MV PEAK A VEL: 0.31 M/S
MV PEAK E VEL: 65 CM/S
RIGHT ATRIUM AREA SYSTOLE A4C: 11.8 CM2
RIGHT VENTRICLE ID DIMENSION: 3.1 CM
SL CV LEFT ATRIUM LENGTH A2C: 3.8 CM
SL CV PED ECHO LEFT VENTRICLE DIASTOLIC VOLUME (MOD BIPLANE) 2D: 89 ML
SL CV PED ECHO LEFT VENTRICLE SYSTOLIC VOLUME (MOD BIPLANE) 2D: 33 ML

## 2022-08-24 PROCEDURE — 93306 TTE W/DOPPLER COMPLETE: CPT

## 2022-08-24 PROCEDURE — 93306 TTE W/DOPPLER COMPLETE: CPT | Performed by: INTERNAL MEDICINE

## 2022-08-24 NOTE — TELEPHONE ENCOUNTER
Is Amando Lorenzo cleared for her surgery tomorrow with Dr Reid Szymanski? Echo was completed today

## 2022-08-25 ENCOUNTER — ANESTHESIA (OUTPATIENT)
Dept: PERIOP | Facility: HOSPITAL | Age: 52
End: 2022-08-25
Payer: COMMERCIAL

## 2022-08-25 ENCOUNTER — HOSPITAL ENCOUNTER (OUTPATIENT)
Facility: HOSPITAL | Age: 52
Setting detail: OUTPATIENT SURGERY
Discharge: HOME | End: 2022-08-25
Attending: ORTHOPAEDIC SURGERY | Admitting: ORTHOPAEDIC SURGERY
Payer: COMMERCIAL

## 2022-08-25 VITALS
RESPIRATION RATE: 18 BRPM | BODY MASS INDEX: 26.48 KG/M2 | HEART RATE: 61 BPM | WEIGHT: 144.8 LBS | SYSTOLIC BLOOD PRESSURE: 118 MMHG | DIASTOLIC BLOOD PRESSURE: 77 MMHG | OXYGEN SATURATION: 98 % | TEMPERATURE: 97.9 F

## 2022-08-25 DIAGNOSIS — S83.271D COMPLEX TEAR OF LATERAL MENISCUS OF RIGHT KNEE, UNSPECIFIED WHETHER OLD OR CURRENT TEAR, SUBSEQUENT ENCOUNTER: Primary | ICD-10-CM

## 2022-08-25 PROCEDURE — 29881 ARTHRS KNE SRG MNISECTMY M/L: CPT | Performed by: ORTHOPAEDIC SURGERY

## 2022-08-25 PROCEDURE — 29881 ARTHRS KNE SRG MNISECTMY M/L: CPT | Performed by: PHYSICIAN ASSISTANT

## 2022-08-25 PROCEDURE — 99024 POSTOP FOLLOW-UP VISIT: CPT | Performed by: PHYSICIAN ASSISTANT

## 2022-08-25 RX ORDER — ONDANSETRON 2 MG/ML
INJECTION INTRAMUSCULAR; INTRAVENOUS AS NEEDED
Status: DISCONTINUED | OUTPATIENT
Start: 2022-08-25 | End: 2022-08-25

## 2022-08-25 RX ORDER — SODIUM CHLORIDE, SODIUM LACTATE, POTASSIUM CHLORIDE, CALCIUM CHLORIDE 600; 310; 30; 20 MG/100ML; MG/100ML; MG/100ML; MG/100ML
125 INJECTION, SOLUTION INTRAVENOUS CONTINUOUS
Status: DISCONTINUED | OUTPATIENT
Start: 2022-08-25 | End: 2022-08-25 | Stop reason: HOSPADM

## 2022-08-25 RX ORDER — CEFAZOLIN SODIUM 2 G/50ML
2000 SOLUTION INTRAVENOUS ONCE
Status: DISCONTINUED | OUTPATIENT
Start: 2022-08-25 | End: 2022-08-25 | Stop reason: HOSPADM

## 2022-08-25 RX ORDER — KETOROLAC TROMETHAMINE 30 MG/ML
INJECTION, SOLUTION INTRAMUSCULAR; INTRAVENOUS AS NEEDED
Status: DISCONTINUED | OUTPATIENT
Start: 2022-08-25 | End: 2022-08-25

## 2022-08-25 RX ORDER — MIDAZOLAM HYDROCHLORIDE 2 MG/2ML
INJECTION, SOLUTION INTRAMUSCULAR; INTRAVENOUS AS NEEDED
Status: DISCONTINUED | OUTPATIENT
Start: 2022-08-25 | End: 2022-08-25

## 2022-08-25 RX ORDER — MAGNESIUM HYDROXIDE 1200 MG/15ML
LIQUID ORAL AS NEEDED
Status: DISCONTINUED | OUTPATIENT
Start: 2022-08-25 | End: 2022-08-25 | Stop reason: HOSPADM

## 2022-08-25 RX ORDER — LIDOCAINE HYDROCHLORIDE 10 MG/ML
INJECTION, SOLUTION EPIDURAL; INFILTRATION; INTRACAUDAL; PERINEURAL AS NEEDED
Status: DISCONTINUED | OUTPATIENT
Start: 2022-08-25 | End: 2022-08-25

## 2022-08-25 RX ORDER — DEXAMETHASONE SODIUM PHOSPHATE 4 MG/ML
INJECTION, SOLUTION INTRA-ARTICULAR; INTRALESIONAL; INTRAMUSCULAR; INTRAVENOUS; SOFT TISSUE AS NEEDED
Status: DISCONTINUED | OUTPATIENT
Start: 2022-08-25 | End: 2022-08-25

## 2022-08-25 RX ORDER — SODIUM CHLORIDE, SODIUM LACTATE, POTASSIUM CHLORIDE, CALCIUM CHLORIDE 600; 310; 30; 20 MG/100ML; MG/100ML; MG/100ML; MG/100ML
100 INJECTION, SOLUTION INTRAVENOUS CONTINUOUS
Status: DISCONTINUED | OUTPATIENT
Start: 2022-08-25 | End: 2022-08-25 | Stop reason: HOSPADM

## 2022-08-25 RX ORDER — PROPOFOL 10 MG/ML
INJECTION, EMULSION INTRAVENOUS AS NEEDED
Status: DISCONTINUED | OUTPATIENT
Start: 2022-08-25 | End: 2022-08-25

## 2022-08-25 RX ORDER — BUPIVACAINE HYDROCHLORIDE AND EPINEPHRINE 2.5; 5 MG/ML; UG/ML
INJECTION, SOLUTION INFILTRATION; PERINEURAL AS NEEDED
Status: DISCONTINUED | OUTPATIENT
Start: 2022-08-25 | End: 2022-08-25 | Stop reason: HOSPADM

## 2022-08-25 RX ORDER — ONDANSETRON 2 MG/ML
4 INJECTION INTRAMUSCULAR; INTRAVENOUS ONCE AS NEEDED
Status: DISCONTINUED | OUTPATIENT
Start: 2022-08-25 | End: 2022-08-25 | Stop reason: HOSPADM

## 2022-08-25 RX ORDER — FENTANYL CITRATE/PF 50 MCG/ML
50 SYRINGE (ML) INJECTION
Status: DISCONTINUED | OUTPATIENT
Start: 2022-08-25 | End: 2022-08-25 | Stop reason: HOSPADM

## 2022-08-25 RX ORDER — EPHEDRINE SULFATE 50 MG/ML
INJECTION INTRAVENOUS AS NEEDED
Status: DISCONTINUED | OUTPATIENT
Start: 2022-08-25 | End: 2022-08-25

## 2022-08-25 RX ORDER — CEFAZOLIN SODIUM 2 G/50ML
SOLUTION INTRAVENOUS AS NEEDED
Status: DISCONTINUED | OUTPATIENT
Start: 2022-08-25 | End: 2022-08-25

## 2022-08-25 RX ORDER — FENTANYL CITRATE 50 UG/ML
INJECTION, SOLUTION INTRAMUSCULAR; INTRAVENOUS AS NEEDED
Status: DISCONTINUED | OUTPATIENT
Start: 2022-08-25 | End: 2022-08-25

## 2022-08-25 RX ADMIN — CEFAZOLIN SODIUM 2000 MG: 2 SOLUTION INTRAVENOUS at 10:11

## 2022-08-25 RX ADMIN — MIDAZOLAM 2 MG: 1 INJECTION INTRAMUSCULAR; INTRAVENOUS at 10:01

## 2022-08-25 RX ADMIN — FENTANYL CITRATE 25 MCG: 50 INJECTION, SOLUTION INTRAMUSCULAR; INTRAVENOUS at 10:20

## 2022-08-25 RX ADMIN — LIDOCAINE HYDROCHLORIDE 50 MG: 10 INJECTION, SOLUTION EPIDURAL; INFILTRATION; INTRACAUDAL at 10:09

## 2022-08-25 RX ADMIN — PROPOFOL 200 MG: 10 INJECTION, EMULSION INTRAVENOUS at 10:09

## 2022-08-25 RX ADMIN — FENTANYL CITRATE 25 MCG: 50 INJECTION, SOLUTION INTRAMUSCULAR; INTRAVENOUS at 10:25

## 2022-08-25 RX ADMIN — DEXAMETHASONE SODIUM PHOSPHATE 4 MG: 4 INJECTION INTRA-ARTICULAR; INTRALESIONAL; INTRAMUSCULAR; INTRAVENOUS; SOFT TISSUE at 10:15

## 2022-08-25 RX ADMIN — ONDANSETRON 4 MG: 2 INJECTION INTRAMUSCULAR; INTRAVENOUS at 10:15

## 2022-08-25 RX ADMIN — FENTANYL CITRATE 50 MCG: 50 INJECTION INTRAMUSCULAR; INTRAVENOUS at 11:30

## 2022-08-25 RX ADMIN — EPHEDRINE SULFATE 10 MG: 50 INJECTION, SOLUTION INTRAVENOUS at 10:31

## 2022-08-25 RX ADMIN — KETOROLAC TROMETHAMINE 15 MG: 30 INJECTION, SOLUTION INTRAMUSCULAR at 10:37

## 2022-08-25 RX ADMIN — SODIUM CHLORIDE, SODIUM LACTATE, POTASSIUM CHLORIDE, AND CALCIUM CHLORIDE: .6; .31; .03; .02 INJECTION, SOLUTION INTRAVENOUS at 09:03

## 2022-08-25 NOTE — PERIOPERATIVE NURSING NOTE
Received patient from PACU in room 10, patient is alert and awake, VSS, denies pain, no distress noted, dressing clean, dry and intact  Neurovascular assessment is WNL in RLE  Patient is tolerating PO fluids, call bell placed in reach, will continue to monitor patient until d/c criteria met

## 2022-08-25 NOTE — ANESTHESIA PREPROCEDURE EVALUATION
Procedure:  ARTHROSCOPY KNEE LATERAL MENISCECTOMY (Right Knee)    Relevant Problems   ANESTHESIA (within normal limits)      CARDIO (within normal limits)      ENDO (within normal limits)      PULMONARY (within normal limits)        Physical Exam    Airway    Mallampati score: II  TM Distance: >3 FB  Neck ROM: full     Dental   No notable dental hx     Cardiovascular  Rhythm: regular, Rate: normal,     Pulmonary  Breath sounds clear to auscultation,     Other Findings        Anesthesia Plan  ASA Score- 1     Anesthesia Type- general with ASA Monitors  Additional Monitors:   Airway Plan: LMA  Plan Factors-Exercise tolerance (METS): >4 METS  Chart reviewed  Existing labs reviewed  Patient summary reviewed  Patient is not a current smoker  Induction- intravenous  Postoperative Plan- Plan for postoperative opioid use  Planned trial extubation    Informed Consent- Anesthetic plan and risks discussed with patient  I personally reviewed this patient with the CRNA  Discussed and agreed on the Anesthesia Plan with the CRNA  Angie Oviedo

## 2022-08-25 NOTE — OP NOTE
OPERATIVE REPORT  PATIENT NAME: Kumar Jeter    :  1970  MRN: [id number]  Pt Location: [facility] OR ROOM 01    SURGERY DATE: 2022    Surgeon(s) and Role:     * Rossana Jalloh MD - Primary     * Isaiah Knowles PA-C - Assisting necessary for the procedure for assistance with improved visualization due to the minimally invasive arthroscopic techniques utilized for the operation for assistance utilization of the camera and shaver and biter as well as assistance with utilization of the [address] [place] [address] Street A  T C  And [facility] 2nd assistant    Preop Diagnosis:  Internal derangement of right knee [M23 91] with lateral meniscus anterior horn tear    Post-Op Diagnosis Codes:     * Internal derangement of right knee [M23 91] with complex lateral meniscus anterior horn tear    Procedure(s) (LRB):  ARTHROSCOPY KNEE LATERAL MENISCECTOMY (Right)    Specimen(s):  * No specimens in log *    Estimated Blood Loss:   Minimal    Drains:  * No LDAs found *    Anesthesia Type:   General    Operative Indications:  Internal derangement of right knee [M23 91]  July Mackay is a 80-year-old female who has been suffering with right knee pain  MRI demonstrated a complex anterior horn lateral meniscus tear  She had failed non operative measures and wished to undergo a right knee arthroscopy for lateral meniscectomy  She understood the risks and benefits of that procedure wished to go ahead  The risks are inclusive of but not limited to infection, stiffness, nerve or blood vessel injury causing numbness pain and weakness, blood clots, failure to achieve anticipated results, worsening of symptoms, failure to regain full strength and ability, recurrent tear, and need for further surgery  Operative Findings:  Right knee exam under anesthesia demonstrated range of motion 0-120 preoperatively with good stability to varus valgus stress well as anterior posterior drawer    Intra-articular findings demonstrated good appearance of articular cartilage throughout the knee with no significant arthritic change found in any of the 3 compartments  Medial lateral gutters were intact with no loose bodies and the ACL was also intact  Medial meniscus was intact  Lateral meniscus had highly complex tearing along the anterior horn and into the body requiring debridement with a biter and shaver and also utilized the [place] for peripheral bleeding control  We had a stable anterior horn of the lateral meniscus at the end of the operation after probing  Complications:   None    Procedure and Technique:  Barry Young was taken to the operating room and placed supine on the OR table  She was given preoperative IV antibiotics  General anesthesia was induced the right knee was taken through exam under anesthesia as described above  Right lower extremity was then prepped and draped in usual sterile fashion  Surgical time-out was taken  We injected local anesthetic into both portals  The anterolateral portal made with 11 blade  Diagnostic arthroscopy begun an anteromedial portal made with 11 blade  We demonstrated good appearance of articular cartilage in all 3 compartments with no loose bodies in either gutter and the ACL being intact  Medial meniscus was also intact  Lateral meniscus had highly complex tears along the anterior horn as well as into the body  We were able to debride the body of the meniscus tears with a shaver to a stable rim of tissue and the anterior horn were able to debride with a shaver and a biter back to a stable rim of tissue  We did also utilized the [place] for the peripheral bleeding control particularly anteriorly  We did probe the anterior horn lateral meniscus and found that to be stable with no obvious signs of other tears and good stability noted of the meniscus itself  We then removed arthroscopic equipment and closed the portals with 4-0 nylon suture    Local anesthetic injected into the knee and dry, sterile dressings were applied an Ace bandage  She tolerated procedure well and transferred to recovery room stable condition  She will follow up in 1 week for suture removal   She will be on aspirin for DVT prophylaxis  She will start physical therapy as soon as possible     I was present for the entire procedure and A qualified resident physician was not available    Patient Disposition:  PACU       SIGNATURE: Sarah Bernstein MD  DATE: August 25, 2022  TIME: 10:38 AM

## 2022-08-25 NOTE — H&P
Assessment/Plan:  The patient would like to proceed with right knee arthroscopy with lateral meniscectomy  We discussed the procedure and risks at length, including but not limited to, infection, bleeding, wound issues, nerve injury, blood clot, stiffness, failure of procedure, and need for additional surgery  We will see the patient back at the time of surgery  Subjective:   Sharyle Seminole is a 46 y o  female with right knee lateral meniscus tear who notes ongoing pain and weakness about the knee  Review of Systems   Constitutional: Negative  Negative for chills and fever  HENT: Negative  Negative for ear pain and sore throat  Eyes: Negative  Negative for pain and redness  Respiratory: Negative  Negative for shortness of breath and wheezing  Cardiovascular: Negative for chest pain and palpitations  Gastrointestinal: Negative  Negative for abdominal pain and blood in stool  Endocrine: Negative  Negative for polydipsia and polyuria  Genitourinary: Negative  Negative for difficulty urinating and dysuria  Musculoskeletal:        As noted in HPI   Skin: Negative  Negative for pallor and rash  Neurological: Negative  Negative for dizziness and numbness  Hematological: Negative  Negative for adenopathy  Does not bruise/bleed easily  Psychiatric/Behavioral: Negative  Negative for confusion and suicidal ideas  History reviewed  No pertinent past medical history      Past Surgical History:   Procedure Laterality Date    ABDOMINOPLASTY      AUGMENTATION BREAST         Family History   Problem Relation Age of Onset    No Known Problems Mother     No Known Problems Father        Social History     Occupational History    Not on file   Tobacco Use    Smoking status: Never Smoker    Smokeless tobacco: Never Used   Substance and Sexual Activity    Alcohol use: Yes     Comment: social only    Drug use: Never    Sexual activity: Yes         Current Facility-Administered Medications:     ceFAZolin (ANCEF) IVPB (premix in dextrose) 2,000 mg 50 mL, 2,000 mg, Intravenous, Once, [place] ARMANI Watters    lactated ringers infusion, 125 mL/hr, Intravenous, Continuous, Bessy Ruiz MD    No Known Allergies    Objective:  Vitals:    08/25/22 0802   BP: 110/71   Pulse: 58   Resp: 16   Temp: (!) 96 5 °F (35 8 °C)   SpO2: 98%       Ortho Exam    Physical Exam  Constitutional:       General: She is not in acute distress  Appearance: She is well-developed  HENT:      Head: Normocephalic and atraumatic  Eyes:      General: No scleral icterus  Conjunctiva/sclera: Conjunctivae normal    Neck:      Vascular: No JVD  Cardiovascular:      Rate and Rhythm: Normal rate  Pulmonary:      Effort: Pulmonary effort is normal  No respiratory distress  Skin:     General: Skin is warm  Neurological:      Mental Status: She is alert and oriented to person, place, and time  Coordination: Coordination normal          Right knee: Latearal joint line tenderness

## 2022-08-25 NOTE — DISCHARGE INSTRUCTIONS
INSTRUCTIONS FOLLOWING YOUR KNEE ARTHROSCOPY    FIRST FOLLOW-UP VISIT AFTER SURGERY     Call the office the day after your surgery & make an appointment for 1 week to see Dr Dennise Schirmer  At that appointment, your stitches will be removed  CANE OR CRUTCHES     You may put as much weight on your leg as tolerated when using the crutches/cane  When you feel that the crutches/cane is not necessary, you may discontinue its use  Use common sense, let pain be your guide for your activities  Climbing stairs, walking and sitting are all permitted as you tolerate them  EXERCISE PROGRAM     At your 1st follow-up visit you will be given a prescription for physical therapy if you have not already been given one  SHOWERING     Keep original bandage on for 48 hours after surgery & replace with band-aids  You should keep the band-aids on until you see Dr Dennise Schirmer  After 48 hours, it is okay to get your incision wet & you may shower  Do not take any baths or soak in a hot tub or pool until your stitches have been removed  INCISION SITE     You may notice a small amount of blood on your bandage, about the size of a quarter, this is normal and there is no need to worry  If you notice uncontrollable or excessive bleeding, oozing, or redness of the wound please call the office  SWELLING AND DISCOMFORT     You will experience some pain and discomfort after surgery  You will be given a prescription for pain medication  Take the medication as prescribed  If the discomfort is mild, you can take 1 or 2 Tylenol, every 4-6 hours as needed, instead of the prescribed pain medication  You will notice some swelling of your knee after surgery, this is normal      To help reduce the swelling, elevate your leg as much as possible the first two days  In addition, you may place ice on your knee to reduce swelling  Place a plastic bag filled with ice, wrapped in a thin towel, on your knee   Do not keep ice on for more than 15-20 minutes, repeat 4-5 TIMES A DAY, IF POSSIBLE  BLOOD CLOT PREVENTION    Unless otherwise instructed, take one 325 mg aspirin daily for the first 3 weeks following surgery  This is to help decrease the risk of blood clots  If at any time you have discomfort, swelling, or redness in the calf (behind the leg between the knee and the ankle)  or difficulty breathing or heaviness in the chest, please call the doctor immediately  TEMPERATURE     A temperature of less than 101 degrees is common for the first 1-2 days after surgery  If your temperature is elevated above 101 degrees, call the office  IF YOU HAVE ANY OTHER CONCERNS OR QUESTIONS AT ANY TIME, DO NOT HESITATE TO CALL THE OFFICE [phone]

## 2022-08-25 NOTE — ANESTHESIA POSTPROCEDURE EVALUATION
Post-Op Assessment Note    CV Status:  Stable    Pain management: adequate     Mental Status:  Somnolent   Hydration Status:  Euvolemic   PONV Controlled:  Controlled   Airway Patency:  Patent      Post Op Vitals Reviewed: Yes      Staff: CRNA         No complications documented      BP   116/70   Temp  97 4   Pulse  70   Resp 18   SpO2   99

## 2022-09-02 ENCOUNTER — OFFICE VISIT (OUTPATIENT)
Dept: OBGYN CLINIC | Facility: CLINIC | Age: 52
End: 2022-09-02

## 2022-09-02 VITALS
DIASTOLIC BLOOD PRESSURE: 75 MMHG | SYSTOLIC BLOOD PRESSURE: 123 MMHG | HEART RATE: 78 BPM | BODY MASS INDEX: 26.68 KG/M2 | HEIGHT: 62 IN | TEMPERATURE: 98.2 F | RESPIRATION RATE: 18 BRPM | WEIGHT: 145 LBS

## 2022-09-02 DIAGNOSIS — Z98.890 S/P RIGHT KNEE ARTHROSCOPY: Primary | ICD-10-CM

## 2022-09-02 PROCEDURE — 99024 POSTOP FOLLOW-UP VISIT: CPT | Performed by: ORTHOPAEDIC SURGERY

## 2022-09-02 NOTE — PROGRESS NOTES
Assessment/Plan:  1  S/P right knee arthroscopy       Scribe Attestation    I,:  Sarah Dewitt MA am acting as a scribe while in the presence of the attending physician :       I,:  Daljit Licea MD personally performed the services described in this documentation    as scribed in my presence :             Fernando Soler is doing well 8 days status post right knee arthroscopy with lateral meniscectomy  Her incisions are healing well without signs of infection  Sutures were removed in the office today ans steri-strips were applied  She may shower however, was advised to avoid soaking or submerging the incision for another week  She will continue with formal therapy  She may follow up with me as needed  Subjective:   Mirella Jorgensen is a 46 y o  female who presents to the office today for follow up evaluation 8 days status post right knee arthroscopy with lateral meniscectomy performed on 8/25/22  The patient states she is doing well  She has been attending formal therapy at [facility] in [place]  She notes stiffness and soreness with ambulation and knee flexion  The patient states she was subconsciously watching TV with her son when she bent her knee which caused increased pain  She has been taking Tylenol OTC for pain  Review of Systems   Constitutional: Negative for chills and fever  HENT: Negative for drooling and sneezing  Eyes: Negative for redness  Respiratory: Negative for cough and wheezing  Gastrointestinal: Negative for nausea and vomiting  Musculoskeletal: Negative for arthralgias, joint swelling and myalgias  Neurological: Negative for weakness and numbness  Psychiatric/Behavioral: Negative for behavioral problems  The patient is not nervous/anxious  History reviewed  No pertinent past medical history      Past Surgical History:   Procedure Laterality Date    ABDOMINOPLASTY      AUGMENTATION BREAST      CA KNEE SCOPE,MED/LAT MENISECTOMY Right 8/25/2022 Procedure: ARTHROSCOPY KNEE LATERAL MENISCECTOMY;  Surgeon: Nevaeh Garza MD;  Location: [facility];  Service: Orthopedics       Family History   Problem Relation Age of Onset    No Known Problems Mother     No Known Problems Father        Social History     Occupational History    Not on file   Tobacco Use    Smoking status: Never Smoker    Smokeless tobacco: Never Used   Substance and Sexual Activity    Alcohol use: Yes     Comment: social only    Drug use: Never    Sexual activity: Yes         Current Outpatient Medications:     acetaminophen (TYLENOL) 325 mg tablet, Take 650 mg by mouth every 6 (six) hours as needed for mild pain, Disp: , Rfl:     ibuprofen (MOTRIN) 200 mg tablet, Take by mouth every 6 (six) hours as needed for mild pain, Disp: , Rfl:     No Known Allergies    Objective:  Vitals:    09/02/22 1126   BP: 123/75   Pulse: 78   Resp: 18   Temp: 98 2 °F (36 8 °C)       Right Knee Exam     Range of Motion   Extension: 0     Other   Erythema: absent  Sensation: normal  Pulse: present  Effusion: no effusion present    Comments:  Surgical incisions healing well  No erythema or signs of infection  Sutures removed in the office today and steri-strips were applied           Observations     Right Knee   Negative for effusion  Physical Exam  Constitutional:       Appearance: She is well-developed  HENT:      Head: Normocephalic and atraumatic  Eyes:      General:         Right eye: No discharge  Left eye: No discharge  Conjunctiva/sclera: Conjunctivae normal    Cardiovascular:      Rate and Rhythm: Normal rate  Pulmonary:      Effort: Pulmonary effort is normal  No respiratory distress  Musculoskeletal:      Cervical back: Normal range of motion and neck supple  Right knee: No effusion  Comments: As noted in HPI   Skin:     General: Skin is warm and dry  Neurological:      Mental Status: She is alert and oriented to person, place, and time  Psychiatric:         Behavior: Behavior normal          Thought Content: Thought content normal          Judgment: Judgment normal        This document was created using speech voice recognition software  Grammatical errors, random word insertions, pronoun errors, and incomplete sentences are an occasional consequence of this system due to software limitations, ambient noise, and hardware issues  Any formal questions or concerns about content, text, or information contained within the body of this dictation should be directly addressed to the provider for clarification

## 2022-10-03 ENCOUNTER — TELEPHONE (OUTPATIENT)
Dept: OBGYN CLINIC | Facility: CLINIC | Age: 52
End: 2022-10-03

## 2022-10-03 NOTE — TELEPHONE ENCOUNTER
I had sent her this message on [employer],     The autoimmune arthritis testing is all normal which is great and rules out conditions like lupus and rheumatoid arthritis for example  Please have the xrays done at your convenience  As we discussed at the office visit since we ruled out rheumatic causes for body pain, please follow up with your primary care physician or orthopedics for pain control       Take care,     Dr Gentry Roper

## 2022-10-03 NOTE — TELEPHONE ENCOUNTER
Sheila Lopes  Re: Lab results  #: [phone]    Patient would like a call back to discuss lab results from June , she never got the message from July       Please call her back

## (undated) DEVICE — INTENDED FOR TISSUE SEPARATION, AND OTHER PROCEDURES THAT REQUIRE A SHARP SURGICAL BLADE TO PUNCTURE OR CUT.: Brand: BARD-PARKER SAFETY BLADES SIZE 11, STERILE

## (undated) DEVICE — FABRIC REINFORCED, SURGICAL GOWN, XL: Brand: CONVERTORS

## (undated) DEVICE — CHLORAPREP HI-LITE 26ML ORANGE

## (undated) DEVICE — PROBE ABLATION  APOLLO RF 50 DEG MULTI PORT

## (undated) DEVICE — ARTHROSCOPY FLOOR MAT

## (undated) DEVICE — GLOVE INDICATOR PI UNDERGLOVE SZ 7.5 BLUE

## (undated) DEVICE — ACE WRAP 6 IN XL STERILE

## (undated) DEVICE — GLOVE SRG BIOGEL 7.5

## (undated) DEVICE — TIBURON EXTREMITY SHEET: Brand: CONVERTORS

## (undated) DEVICE — TIBURON SPLIT SHEET: Brand: CONVERTORS

## (undated) DEVICE — TUBING ARTHROSCOPIC WAVE  MAIN PUMP

## (undated) DEVICE — GLOVE SRG BIOGEL 6.5

## (undated) DEVICE — SPONGE GAUZE 4 X 8 12 PLY STRL LF

## (undated) DEVICE — GLOVE INDICATOR PI UNDERGLOVE SZ 8 BLUE

## (undated) DEVICE — PACK ARTHROSCOPY

## (undated) DEVICE — BLADE SHAVER TORPEDO CRV 4MM 13MM COOLCUT

## (undated) DEVICE — GLOVE INDICATOR PI UNDERGLOVE SZ 6.5 BLUE

## (undated) DEVICE — OCCLUSIVE GAUZE STRIP,3% BISMUTH TRIBROMOPHENATE IN PETROLATUM BLEND: Brand: XEROFORM

## (undated) DEVICE — GLOVE SRG BIOGEL 8